# Patient Record
Sex: MALE | Race: BLACK OR AFRICAN AMERICAN | NOT HISPANIC OR LATINO | URBAN - METROPOLITAN AREA
[De-identification: names, ages, dates, MRNs, and addresses within clinical notes are randomized per-mention and may not be internally consistent; named-entity substitution may affect disease eponyms.]

---

## 2024-07-02 ENCOUNTER — HOSPITAL ENCOUNTER (EMERGENCY)
Facility: HOSPITAL | Age: 24
Discharge: HOME | End: 2024-07-03
Attending: STUDENT IN AN ORGANIZED HEALTH CARE EDUCATION/TRAINING PROGRAM
Payer: MEDICAID

## 2024-07-02 DIAGNOSIS — R94.31 ABNORMAL EKG: ICD-10-CM

## 2024-07-02 DIAGNOSIS — R42 DIZZINESS: ICD-10-CM

## 2024-07-02 DIAGNOSIS — U07.1 COVID: Primary | ICD-10-CM

## 2024-07-02 LAB
CTP QC/QA: YES
MOLECULAR STREP A: NEGATIVE
POC MOLECULAR INFLUENZA A AGN: NEGATIVE
POC MOLECULAR INFLUENZA B AGN: NEGATIVE
POCT GLUCOSE: 112 MG/DL (ref 70–110)
SARS-COV-2 RDRP RESP QL NAA+PROBE: POSITIVE

## 2024-07-02 PROCEDURE — 99285 EMERGENCY DEPT VISIT HI MDM: CPT | Mod: 25

## 2024-07-02 PROCEDURE — 93010 ELECTROCARDIOGRAM REPORT: CPT | Mod: ,,, | Performed by: INTERNAL MEDICINE

## 2024-07-02 PROCEDURE — 82962 GLUCOSE BLOOD TEST: CPT

## 2024-07-02 PROCEDURE — 87635 SARS-COV-2 COVID-19 AMP PRB: CPT | Performed by: PHYSICIAN ASSISTANT

## 2024-07-02 PROCEDURE — 87502 INFLUENZA DNA AMP PROBE: CPT

## 2024-07-02 PROCEDURE — 93005 ELECTROCARDIOGRAM TRACING: CPT

## 2024-07-02 PROCEDURE — 87651 STREP A DNA AMP PROBE: CPT

## 2024-07-03 VITALS
TEMPERATURE: 99 F | HEART RATE: 60 BPM | OXYGEN SATURATION: 99 % | HEIGHT: 67 IN | SYSTOLIC BLOOD PRESSURE: 114 MMHG | BODY MASS INDEX: 24.33 KG/M2 | DIASTOLIC BLOOD PRESSURE: 73 MMHG | RESPIRATION RATE: 18 BRPM | WEIGHT: 155 LBS

## 2024-07-03 LAB
ALBUMIN SERPL BCP-MCNC: 4.1 G/DL (ref 3.5–5.2)
ALP SERPL-CCNC: 69 U/L (ref 55–135)
ALT SERPL W/O P-5'-P-CCNC: 22 U/L (ref 10–44)
ANION GAP SERPL CALC-SCNC: 9 MMOL/L (ref 8–16)
AST SERPL-CCNC: 18 U/L (ref 10–40)
BASOPHILS # BLD AUTO: 0.02 K/UL (ref 0–0.2)
BASOPHILS NFR BLD: 0.3 % (ref 0–1.9)
BILIRUB SERPL-MCNC: 0.7 MG/DL (ref 0.1–1)
BILIRUB UR QL STRIP: NEGATIVE
BUN SERPL-MCNC: 8 MG/DL (ref 6–20)
CALCIUM SERPL-MCNC: 10.1 MG/DL (ref 8.7–10.5)
CHLORIDE SERPL-SCNC: 102 MMOL/L (ref 95–110)
CK SERPL-CCNC: 98 U/L (ref 20–200)
CLARITY UR: CLEAR
CO2 SERPL-SCNC: 26 MMOL/L (ref 23–29)
COLOR UR: YELLOW
CREAT SERPL-MCNC: 0.9 MG/DL (ref 0.5–1.4)
DIFFERENTIAL METHOD BLD: ABNORMAL
EOSINOPHIL # BLD AUTO: 0 K/UL (ref 0–0.5)
EOSINOPHIL NFR BLD: 0.3 % (ref 0–8)
ERYTHROCYTE [DISTWIDTH] IN BLOOD BY AUTOMATED COUNT: 13.2 % (ref 11.5–14.5)
EST. GFR  (NO RACE VARIABLE): >60 ML/MIN/1.73 M^2
GLUCOSE SERPL-MCNC: 99 MG/DL (ref 70–110)
GLUCOSE UR QL STRIP: NEGATIVE
HCT VFR BLD AUTO: 43.4 % (ref 40–54)
HGB BLD-MCNC: 15.1 G/DL (ref 14–18)
HGB UR QL STRIP: NEGATIVE
IMM GRANULOCYTES # BLD AUTO: 0.02 K/UL (ref 0–0.04)
IMM GRANULOCYTES NFR BLD AUTO: 0.3 % (ref 0–0.5)
KETONES UR QL STRIP: NEGATIVE
LEUKOCYTE ESTERASE UR QL STRIP: NEGATIVE
LYMPHOCYTES # BLD AUTO: 1.1 K/UL (ref 1–4.8)
LYMPHOCYTES NFR BLD: 17.8 % (ref 18–48)
MAGNESIUM SERPL-MCNC: 2 MG/DL (ref 1.6–2.6)
MCH RBC QN AUTO: 26.8 PG (ref 27–31)
MCHC RBC AUTO-ENTMCNC: 34.8 G/DL (ref 32–36)
MCV RBC AUTO: 77 FL (ref 82–98)
MONOCYTES # BLD AUTO: 0.6 K/UL (ref 0.3–1)
MONOCYTES NFR BLD: 8.8 % (ref 4–15)
NEUTROPHILS # BLD AUTO: 4.6 K/UL (ref 1.8–7.7)
NEUTROPHILS NFR BLD: 72.5 % (ref 38–73)
NITRITE UR QL STRIP: NEGATIVE
NRBC BLD-RTO: 0 /100 WBC
OHS QRS DURATION: 102 MS
OHS QTC CALCULATION: 425 MS
PH UR STRIP: 6 [PH] (ref 5–8)
PLATELET # BLD AUTO: 247 K/UL (ref 150–450)
PMV BLD AUTO: 8.6 FL (ref 9.2–12.9)
POTASSIUM SERPL-SCNC: 3.9 MMOL/L (ref 3.5–5.1)
PROT SERPL-MCNC: 7.8 G/DL (ref 6–8.4)
PROT UR QL STRIP: NEGATIVE
RBC # BLD AUTO: 5.63 M/UL (ref 4.6–6.2)
SODIUM SERPL-SCNC: 137 MMOL/L (ref 136–145)
SP GR UR STRIP: 1.02 (ref 1–1.03)
TROPONIN I SERPL DL<=0.01 NG/ML-MCNC: <0.006 NG/ML (ref 0–0.03)
URN SPEC COLLECT METH UR: NORMAL
UROBILINOGEN UR STRIP-ACNC: NEGATIVE EU/DL
WBC # BLD AUTO: 6.4 K/UL (ref 3.9–12.7)

## 2024-07-03 PROCEDURE — 83735 ASSAY OF MAGNESIUM: CPT | Performed by: PHYSICIAN ASSISTANT

## 2024-07-03 PROCEDURE — 82550 ASSAY OF CK (CPK): CPT | Performed by: PHYSICIAN ASSISTANT

## 2024-07-03 PROCEDURE — 81003 URINALYSIS AUTO W/O SCOPE: CPT | Performed by: PHYSICIAN ASSISTANT

## 2024-07-03 PROCEDURE — 85025 COMPLETE CBC W/AUTO DIFF WBC: CPT | Performed by: PHYSICIAN ASSISTANT

## 2024-07-03 PROCEDURE — 80053 COMPREHEN METABOLIC PANEL: CPT | Performed by: PHYSICIAN ASSISTANT

## 2024-07-03 PROCEDURE — 84484 ASSAY OF TROPONIN QUANT: CPT | Performed by: PHYSICIAN ASSISTANT

## 2024-07-03 RX ORDER — CETIRIZINE HYDROCHLORIDE 10 MG/1
10 TABLET ORAL DAILY
Qty: 14 TABLET | Refills: 0 | Status: SHIPPED | OUTPATIENT
Start: 2024-07-03 | End: 2024-07-17

## 2024-07-03 RX ORDER — POLYETHYLENE GLYCOL 3350 17 G/17G
17 POWDER, FOR SOLUTION ORAL DAILY
Qty: 14 EACH | Refills: 0 | Status: SHIPPED | OUTPATIENT
Start: 2024-07-03 | End: 2024-07-17

## 2024-07-03 RX ORDER — FLUTICASONE PROPIONATE 50 MCG
1 SPRAY, SUSPENSION (ML) NASAL 2 TIMES DAILY PRN
Qty: 9.9 ML | Refills: 0 | Status: SHIPPED | OUTPATIENT
Start: 2024-07-03

## 2024-07-03 RX ORDER — FAMOTIDINE 20 MG/1
20 TABLET, FILM COATED ORAL 2 TIMES DAILY
Qty: 28 TABLET | Refills: 0 | Status: SHIPPED | OUTPATIENT
Start: 2024-07-03 | End: 2024-07-17

## 2024-07-03 NOTE — DISCHARGE INSTRUCTIONS
Asire w ke w ap bwè anpil likid si w pa manje anpil. Ou ka itilize MiraLax yon fwa chak new si w gen konstipasyon. Kòmanse pran Pepcid de fwa pa new. Zyrtec yon fwa chak new, Flonase nataliia ak nen k ap koule ak konjesyon. Tanpri swiv ak founisè swen prensipal cat egzamen repete ak re-evalyasyon plent aktyèl yo. Etabli swen ak yon kadyològ lokal cat re-evalyasyon yon EKG nòmal, cat re-evalyasyon plent doulè nan pwatrin ak souf kout. Retounen nan ED sa a si souf kout shoshana pi grav, doulè nan pwatrin shoshana pi grav, si w joann w ap joann w joann w, si w pa ka manje oswa bwè, si w kòmanse vomisman, si w pa kapab trete yon lafyèv, si w pa kapab trete yon lafyèv. rive.    Make sure you are drinking plenty of fluids if you are not eating as much.  You can use MiraLax once daily if you are having constipation.  Begin taking Pepcid twice daily.  Zyrtec once daily, Flonase to help with runny nose and congestion.  Please follow-up with primary care provider for repeat exam and re-evaluation of current complaints.  Establish care with a local cardiologist for re-evaluation of an abnormal EKG, for re-evaluation of complaints of chest pain and shortness of breath.  Return to this ED if worsening shortness of breath, worsening chest pain, if you feel as if you are going to pass out, if unable to eat or drink, if you begin with vomiting, if unable to treat a fever, if any other problems occur.

## 2024-07-03 NOTE — FIRST PROVIDER EVALUATION
Medical screening examination initiated.  I have conducted a focused provider triage encounter, findings are as follows:    Brief history of present illness:  [id number] AMN in [place] Creole    Dizzy blurry vision HA and weakness     There were no vitals filed for this visit.    Pertinent physical exam:  awake, alert in no distress. Answering questions without difficulty     Brief workup plan:  further evaluation     Preliminary workup initiated; this workup will be continued and followed by the physician or advanced practice provider that is assigned to the patient when roomed.

## 2024-07-03 NOTE — ED PROVIDER NOTES
Encounter Date: 7/2/2024       History     Chief Complaint   Patient presents with    Dizziness    Blurred Vision     Pt. C/o intermittent blurry vision, dizziness, weakness, and lack of appetite. Pt also c/o sore throat and cold like symptoms     23yo M presents to ED with various complaints.    He states he was recently had nasal congestion, odynophagia, fatigue, generally feeling unwell.  He is unwilling to further delineate how long these symptoms have been occurring.  States he occasionally experiences dizziness, also sometimes with OU blurred vision.  No reported headache.  No neck pain or stiffness.  No reported photophobia.  Denies any head trauma or previous head injury.  Denies any recent illness or known sick contacts.  Patient also admits to intermittent abdominal pain for the past few months, occasionally some pain with meals.  No current abdominal pain.  Patient admits to pain to bilateral knees, also a chronic complaint not acutely worsened.  He does state he has had fever, chills from time to time.  States there is occasional chest pain, sometimes shortness of breath.  No reported leg swelling or shortness of breath.  No reported history of VTE.    He denies any significant past medical history.  Denies personal history of ACS.  Denies history of hypertension, hyperlipidemia, or DM.  States he has a nonsmoker.  Denies any known family history of premature cardiac disease.  Denies history of any congenital cardiac or pulmonary issues.    No daily prescription medications.  No meds taken prior to arrival.      Review of patient's allergies indicates:  No Known Allergies  History reviewed. No pertinent past medical history.  History reviewed. No pertinent surgical history.  No family history on file.     Review of Systems   Constitutional:  Positive for appetite change, chills, fatigue and fever.   Eyes:  Positive for visual disturbance. Negative for photophobia.   Respiratory:  Positive for shortness  of breath.    Cardiovascular:  Positive for chest pain. Negative for leg swelling.   Gastrointestinal:  Positive for abdominal pain.   Musculoskeletal:  Positive for arthralgias. Negative for gait problem and joint swelling.   Neurological:  Positive for dizziness. Negative for syncope and headaches.       Physical Exam     Initial Vitals [07/02/24 2037]   BP Pulse Resp Temp SpO2   122/66 97 18 98.5 °F (36.9 °C) 97 %      MAP       --         Physical Exam    Nursing note and vitals reviewed.  Constitutional: He appears well-developed and well-nourished. He is not diaphoretic. No distress.   HENT:   Head: Normocephalic and atraumatic.   Neck: Neck supple.   Normal range of motion.  Pulmonary/Chest: No respiratory distress. He exhibits no tenderness.   Musculoskeletal:         General: Normal range of motion.      Cervical back: Normal range of motion and neck supple.     Neurological: He is alert and oriented to person, place, and time. GCS score is 15. GCS eye subscore is 4. GCS verbal subscore is 5. GCS motor subscore is 6.   Psychiatric: He has a normal mood and affect. Thought content normal.         ED Course   Procedures  Labs Reviewed   CBC W/ AUTO DIFFERENTIAL - Abnormal; Notable for the following components:       Result Value    MCV 77 (*)     MCH 26.8 (*)     MPV 8.6 (*)     Lymph % 17.8 (*)     All other components within normal limits   SARS-COV-2 RDRP GENE - Abnormal; Notable for the following components:    POC Rapid COVID Positive (*)     All other components within normal limits   POCT GLUCOSE - Abnormal; Notable for the following components:    POCT Glucose 112 (*)     All other components within normal limits   COMPREHENSIVE METABOLIC PANEL   URINALYSIS, REFLEX TO URINE CULTURE    Narrative:     Specimen Source->Urine   CK   TROPONIN I   MAGNESIUM   POCT INFLUENZA A/B MOLECULAR   POCT STREP A MOLECULAR     EKG Readings: (Independently Interpreted)   Normal sinus rhythm, ventricular rate 68 beats  "per minute.  Normal OR, normal QT, normal QRS duration.  No right axis deviation.  No convincing ST elevation.  Questionable incomplete right bundle-branch block pattern.  Widespread nonspecific ST segment change do not think meets criteria for clinically significant ST elevation.  No previous for comparison.       Imaging Results              X-Ray Chest 1 View (Final result)  Result time 07/03/24 06:05:38      Final result by Yadiel Dumont MD (07/03/24 06:05:38)                   Impression:      No acute cardiopulmonary finding identified on this single view.      Electronically signed by: Yadiel Dumont MD  Date:    07/03/2024  Time:    06:05               Narrative:    EXAMINATION:  XR CHEST 1 VIEW    CLINICAL HISTORY:  Provided history is "  Dizziness and giddiness".    TECHNIQUE:  One view of the chest.    COMPARISON:  None.    FINDINGS:  Cardiac silhouette is not enlarged.  No focal consolidation.  No sizable pleural effusion.  No pneumothorax.                                       CT Head Without Contrast (Final result)  Result time 07/02/24 21:44:50      Final result by Erasmo Licea MD (07/02/24 21:44:50)                   Impression:      No acute intracranial process.  Additional evaluation with MRI of the brain, as clinically warranted.      Electronically signed by: Erasmo Licea MD  Date:    07/02/2024  Time:    21:44               Narrative:    EXAMINATION:  CT HEAD WITHOUT CONTRAST    CLINICAL HISTORY:  Dizziness, persistent/recurrent, cardiac or vascular cause suspected;    TECHNIQUE:  Low dose axial images were obtained through the head.  Coronal and sagittal reformations were also performed. Contrast was not administered.    COMPARISON:  None.    FINDINGS:  The subcutaneous tissues are unremarkable.  The bony calvarium is intact.  The paranasal sinuses are unremarkable.  The mastoid air cells are clear.  The orbits and intraorbital contents are within normal limits.    The craniocervical " junction is intact.  The sellar and parasellar structures are unremarkable.  There is no evidence of intracranial hemorrhage.  The ventricles and sulci are within normal limits.  The cisterns are unremarkable.  The gray-white differentiation is maintained.  There is no dense vessel sign.  There is no evidence of mass effect.                                    X-Rays:   Independently Interpreted Readings:   Chest X-Ray: Personal interpretation: No significant cardiomegaly, no convincing effusion, no obvious pneumothorax, no dense lobar consolidation     Medications - No data to display  Medical Decision Making  Differential diagnosis: Anxiety disorder, PE, ACS, GERD, headache disorder, migraine, dehydration, CARSON, vertigo, electrolyte disturbance, myocarditis, pericarditis    Amount and/or Complexity of Data Reviewed  External Data Reviewed: notes.  Labs: ordered. Decision-making details documented in ED Course.  Radiology: ordered and independent interpretation performed. Decision-making details documented in ED Course.  ECG/medicine tests: ordered and independent interpretation performed. Decision-making details documented in ED Course.  Discussion of management or test interpretation with external provider(s): Patient appears a bit inpatient, agitated that I am having to ask him questions.  He has a bit terse with the  from time to time.  He has not being very forthcoming with any details, chronicity of symptoms.  He has a wide variety of symptoms, states he has had majority of the symptoms prior to today's visit.  He denies any significant past medical history.  His vitals reassuring safe for mild tachycardia.  He is COVID positive.  There is no hypoxia.  He does admit to shortness of breath, unsure how long or if he is currently having shortness of breath as he is not willing to elucidate very much.  Given language barrier, difficult relationship/communication with patient, will order labs to ensure no  obvious cardiac stress.  There is no hypoxia, no unilateral leg swelling or calf pain, no reported exogenous estrogen, no reported pleuritic complaints, given knee states he has had shortness of breath in the past, think unlikely PE as culprit of patient's presentation.    Workup grossly unremarkable and reassuring.  Reassuring HEART score.  He is COVID positive which may account for some of his complaints.  Advised follow-up with his previous primary care provider for repeat exam and re-evaluation.  Referral placed to see a local cardiologist given abnormal EKG and reported chest pain and shortness of breath.  Return precautions given.    Risk  OTC drugs.      Additional MDM:   Heart Score:    History:          Slightly suspicious.  ECG:             Nonspecific repolarisation disturbance  Age:               Less than 45 years  Risk factors: no risk factors known  Troponin:       Less than or equal to normal limit  Heart Score = 1                                       Clinical Impression:  Final diagnoses:  [R42] Dizziness  [R94.31] Abnormal EKG  [U07.1] COVID (Primary)          ED Disposition Condition    Discharge Stable          ED Prescriptions       Medication Sig Dispense Start Date End Date Auth. Provider    polyethylene glycol (GLYCOLAX) 17 gram PwPk Take 17 g by mouth once daily. for 14 days 14 each 7/3/2024 7/17/2024 Joby Samuels PA-C    famotidine (PEPCID) 20 MG tablet Take 1 tablet (20 mg total) by mouth 2 (two) times daily. for 14 days 28 tablet 7/3/2024 7/17/2024 Joby Samuels PA-C    fluticasone propionate (FLONASE) 50 mcg/actuation nasal spray 1 spray (50 mcg total) by Each Nostril route 2 (two) times daily as needed for Rhinitis (nasal congestion). 9.9 mL 7/3/2024 -- Joby Samuels PA-C    cetirizine (ZYRTEC) 10 MG tablet Take 1 tablet (10 mg total) by mouth once daily. for 14 days 14 tablet 7/3/2024 7/17/2024 Joby Samuels PA-C          Follow-up Information       Follow up  With Specialties Details Why Contact Info    Cardiology, [facility] - Cardiology, Cardiovascular Disease, Cardiac Rehabilitation, Cardiothoracic Surgery Schedule an appointment as soon as possible for a visit  For reevaluation by Cardiologist, for evaluation of chest pain and shortness of breath [address] [place] [id number]  [phone]      Marco Antonio Webber MD  Schedule an appointment as soon as possible for a visit  For reevaluation, If symptoms persist [facility] [place] [address]   [phone] (Work)   [phone] (Fax)             Joby Samuels, SUZI  07/03/24 [id number]